# Patient Record
Sex: MALE | Race: WHITE | NOT HISPANIC OR LATINO | URBAN - METROPOLITAN AREA
[De-identification: names, ages, dates, MRNs, and addresses within clinical notes are randomized per-mention and may not be internally consistent; named-entity substitution may affect disease eponyms.]

---

## 2018-02-02 ENCOUNTER — OFFICE VISIT CONVERTED (OUTPATIENT)
Dept: ORTHOPEDIC SURGERY | Facility: CLINIC | Age: 23
End: 2018-02-02
Attending: ORTHOPAEDIC SURGERY

## 2018-02-12 ENCOUNTER — OFFICE VISIT CONVERTED (OUTPATIENT)
Dept: ORTHOPEDIC SURGERY | Facility: CLINIC | Age: 23
End: 2018-02-12
Attending: ORTHOPAEDIC SURGERY

## 2019-01-21 ENCOUNTER — HOSPITAL ENCOUNTER (OUTPATIENT)
Dept: PHYSICAL THERAPY | Facility: CLINIC | Age: 24
Discharge: HOME | End: 2019-01-21
Attending: EMERGENCY MEDICINE

## 2019-10-10 ENCOUNTER — HOSPITAL ENCOUNTER (OUTPATIENT)
Dept: OTHER | Facility: HOSPITAL | Age: 24
Discharge: HOME | End: 2019-10-10
Attending: FAMILY MEDICINE

## 2019-10-10 LAB
ALBUMIN SERPL-MCNC: 5.1 G/DL (ref 3.5–5)
ALBUMIN/GLOB SERPL: 1.5 {RATIO} (ref 1.4–2.6)
ALP SERPL-CCNC: 75 U/L (ref 53–128)
ALT SERPL-CCNC: 27 U/L (ref 10–40)
ANION GAP SERPL CALC-SCNC: 18 MMOL/L (ref 8–19)
APPEARANCE UR: CLEAR
AST SERPL-CCNC: 24 U/L (ref 15–50)
BASOPHILS # BLD AUTO: 0.05 10*3/UL (ref 0–0.2)
BASOPHILS NFR BLD AUTO: 1.1 % (ref 0–3)
BILIRUB SERPL-MCNC: 0.46 MG/DL (ref 0.2–1.3)
BILIRUB UR QL: NEGATIVE
BUN SERPL-MCNC: 11 MG/DL (ref 5–25)
BUN/CREAT SERPL: 11 {RATIO} (ref 6–20)
CALCIUM SERPL-MCNC: 10.5 MG/DL (ref 8.7–10.4)
CHLORIDE SERPL-SCNC: 100 MMOL/L (ref 99–111)
CHOLEST SERPL-MCNC: 195 MG/DL (ref 107–200)
CHOLEST/HDLC SERPL: 4 {RATIO} (ref 3–6)
COLOR UR: YELLOW
CONV ABS IMM GRAN: 0.02 10*3/UL (ref 0–0.2)
CONV CO2: 26 MMOL/L (ref 22–32)
CONV COLLECTION SOURCE (UA): NORMAL
CONV IMMATURE GRAN: 0.4 % (ref 0–1.8)
CONV TOTAL PROTEIN: 8.4 G/DL (ref 6.3–8.2)
CONV UROBILINOGEN IN URINE BY AUTOMATED TEST STRIP: 0.2 {EHRLICHU}/DL (ref 0.1–1)
CREAT UR-MCNC: 0.99 MG/DL (ref 0.7–1.2)
DEPRECATED RDW RBC AUTO: 40.4 FL (ref 35.1–43.9)
EOSINOPHIL # BLD AUTO: 0.1 10*3/UL (ref 0–0.7)
EOSINOPHIL # BLD AUTO: 2.2 % (ref 0–7)
ERYTHROCYTE [DISTWIDTH] IN BLOOD BY AUTOMATED COUNT: 13.4 % (ref 11.6–14.4)
EST. AVERAGE GLUCOSE BLD GHB EST-MCNC: 108 MG/DL
GFR SERPLBLD BASED ON 1.73 SQ M-ARVRAT: >60 ML/MIN/{1.73_M2}
GLOBULIN UR ELPH-MCNC: 3.3 G/DL (ref 2–3.5)
GLUCOSE SERPL-MCNC: 91 MG/DL (ref 70–99)
GLUCOSE UR QL: NEGATIVE MG/DL
HBA1C MFR BLD: 5.4 % (ref 3.5–5.7)
HCT VFR BLD AUTO: 47 % (ref 42–52)
HDLC SERPL-MCNC: 49 MG/DL (ref 40–60)
HGB BLD-MCNC: 14.5 G/DL (ref 14–18)
HGB UR QL STRIP: NEGATIVE
KETONES UR QL STRIP: NEGATIVE MG/DL
LDLC SERPL CALC-MCNC: 121 MG/DL (ref 70–100)
LEUKOCYTE ESTERASE UR QL STRIP: NEGATIVE
LYMPHOCYTES # BLD AUTO: 1.59 10*3/UL (ref 1–5)
LYMPHOCYTES NFR BLD AUTO: 35.1 % (ref 20–45)
MCH RBC QN AUTO: 25.6 PG (ref 27–31)
MCHC RBC AUTO-ENTMCNC: 30.9 G/DL (ref 33–37)
MCV RBC AUTO: 82.9 FL (ref 80–96)
MONOCYTES # BLD AUTO: 0.34 10*3/UL (ref 0.2–1.2)
MONOCYTES NFR BLD AUTO: 7.5 % (ref 3–10)
NEUTROPHILS # BLD AUTO: 2.43 10*3/UL (ref 2–8)
NEUTROPHILS NFR BLD AUTO: 53.7 % (ref 30–85)
NITRITE UR QL STRIP: NEGATIVE
NRBC CBCN: 0 % (ref 0–0.7)
OSMOLALITY SERPL CALC.SUM OF ELEC: 289 MOSM/KG (ref 273–304)
PH UR STRIP.AUTO: 6 [PH] (ref 5–8)
PLATELET # BLD AUTO: 255 10*3/UL (ref 130–400)
PMV BLD AUTO: 9.5 FL (ref 9.4–12.4)
POTASSIUM SERPL-SCNC: 3.9 MMOL/L (ref 3.5–5.3)
PROT UR QL: NEGATIVE MG/DL
RBC # BLD AUTO: 5.67 10*6/UL (ref 4.7–6.1)
SODIUM SERPL-SCNC: 140 MMOL/L (ref 135–147)
SP GR UR: 1.02 (ref 1–1.03)
T4 FREE SERPL-MCNC: 1.1 NG/DL (ref 0.9–1.8)
TRIGL SERPL-MCNC: 126 MG/DL (ref 40–150)
TSH SERPL-ACNC: 1.91 M[IU]/L (ref 0.27–4.2)
VLDLC SERPL-MCNC: 25 MG/DL (ref 5–37)
WBC # BLD AUTO: 4.53 10*3/UL (ref 4.8–10.8)

## 2019-10-11 LAB — HCV AB SER DONR QL: 0.1 S/CO RATIO (ref 0–0.9)

## 2019-10-13 LAB — CONV HIV COMBO AG/AB (HIV-1/O/2) WITH REFLEX: NEGATIVE

## 2019-10-14 ENCOUNTER — OFFICE VISIT CONVERTED (OUTPATIENT)
Dept: ORTHOPEDIC SURGERY | Facility: CLINIC | Age: 24
End: 2019-10-14
Attending: ORTHOPAEDIC SURGERY

## 2021-05-15 VITALS — HEART RATE: 90 BPM | OXYGEN SATURATION: 97 % | BODY MASS INDEX: 24.22 KG/M2 | HEIGHT: 71 IN | WEIGHT: 173 LBS

## 2021-05-16 VITALS — WEIGHT: 160 LBS | BODY MASS INDEX: 22.4 KG/M2 | HEIGHT: 71 IN | HEART RATE: 77 BPM | OXYGEN SATURATION: 98 %

## 2021-05-16 VITALS — BODY MASS INDEX: 22.4 KG/M2 | HEIGHT: 71 IN | OXYGEN SATURATION: 96 % | WEIGHT: 160 LBS | HEART RATE: 101 BPM

## 2024-08-06 ENCOUNTER — OFFICE VISIT (OUTPATIENT)
Dept: ORTHOPEDIC SURGERY | Facility: CLINIC | Age: 29
End: 2024-08-06
Payer: COMMERCIAL

## 2024-08-06 VITALS
DIASTOLIC BLOOD PRESSURE: 85 MMHG | SYSTOLIC BLOOD PRESSURE: 122 MMHG | WEIGHT: 188.4 LBS | HEIGHT: 71 IN | HEART RATE: 91 BPM | OXYGEN SATURATION: 93 % | BODY MASS INDEX: 26.38 KG/M2

## 2024-08-06 DIAGNOSIS — M25.561 RIGHT KNEE PAIN, UNSPECIFIED CHRONICITY: Primary | ICD-10-CM

## 2024-08-06 DIAGNOSIS — M25.562 LEFT KNEE PAIN, UNSPECIFIED CHRONICITY: ICD-10-CM

## 2024-08-06 DIAGNOSIS — M22.2X2 PATELLOFEMORAL PAIN SYNDROME OF BOTH KNEES: ICD-10-CM

## 2024-08-06 DIAGNOSIS — M22.2X1 PATELLOFEMORAL PAIN SYNDROME OF BOTH KNEES: ICD-10-CM

## 2024-08-06 PROCEDURE — 99203 OFFICE O/P NEW LOW 30 MIN: CPT | Performed by: ORTHOPAEDIC SURGERY

## 2024-08-06 NOTE — PROGRESS NOTES
"Chief Complaint  Initial Evaluation of the Right Knee and Initial Evaluation of the Left Knee     Subjective      Efra Martinez presents to [facility] for initial evaluation of bilateral knees. He has had pain in his knee for years.  He has bad genetics with knees and just wants to check his knees out.  He has pain deep in the patella. He was at active 8 games and has had increase pain in the right knee since then.  Typically his left knee is the worst.   He has had no recent injury or falls.      No Known Allergies     Social History     Socioeconomic History    Marital status: Single   Tobacco Use    Smoking status: Never    Smokeless tobacco: Never   Vaping Use    Vaping status: Every Day    Substances: Nicotine, Flavoring    Devices: Disposable, Pre-filled pod   Substance and Sexual Activity    Alcohol use: Yes    Drug use: Defer    Sexual activity: Defer        I reviewed the patient's chief complaint, history of present illness, review of systems, past medical history, surgical history, family history, social history, medications, and allergy list.     Review of Systems     Constitutional: Denies fevers, chills, weight loss  Cardiovascular: Denies chest pain, shortness of breath  Skin: Denies rashes, acute skin changes  Neurologic: Denies headache, loss of consciousness        Vital Signs:   /85   Pulse 91   Ht 180.3 cm (70.98\")   Wt 85.5 kg (188 lb 6.4 oz)   SpO2 93%   BMI 26.29 kg/m²          Physical Exam  General: Alert. No acute distress    Ortho Exam        BILATERAL KNEES Flexion 130. Extension 0. Stable to varus/valgus stress. Stable to anterior/posterior drawer. Neurovascularly intact. Negative Adam. Negative Lachman. Positive EHL, FHL, HS and TA. Sensation intact to light touch all 5 nerves of the foot. Ambulates with Non-antalgic gait. Patella is well tracking. Calf supple, non-tender. Negative tenderness to the medial joint line. Negative tenderness " to the lateral joint line. Negative Crepitus. Good strength to hamstrings, quadriceps, dorsiflexors, and plantar flexors.  Knee Extensor Mechanism intact Mild tenderness over the patella.        Procedures      Imaging Results (Most Recent)       Procedure Component Value Units Date/Time    XR Knee 3 View Right [[id number]] Resulted: 08/06/24 1522     Updated: 08/06/24 1526    XR Knee 3 View Left [[id number]] Resulted: 08/06/24 1522     Updated: 08/06/24 1526             Result Review :     X-Ray Report:  Right knee X-Ray  Indication: Evaluation of the right knee  AP/Lateral and [place] view(s)  Findings:  Mild patella tilt.  No degenerative changes of the knee.  Prior studies available for comparison: no     X-Ray Report:  Left knee X-Ray  Indication: Evaluation of the left knee  AP/Lateral and [place] view(s)  Findings: Mild patella tilt.  No degenerative changes of the knee.   Prior studies available for comparison: no             Assessment and Plan     Diagnoses and all orders for this visit:    1. Right knee pain, unspecified chronicity (Primary)  -     XR Knee 3 View Right    2. Left knee pain, unspecified chronicity  -     XR Knee 3 View Left    3. Patellofemoral pain syndrome of both knees        Discussed the treatment plan with the patient. I reviewed the X-rays that were obtained today with the patient.     Modify activities as needed.  HEP exercises.       Educated on risk of smoking/nicotine. Discussed options for smoking cessation. and Call or return if worsening symptoms.    Follow Up     PRN      Patient was given instructions and counseling regarding his condition or for health maintenance advice. Please see specific information pulled into the AVS if appropriate.     Scribed for Sudha Chavez MD by Gavi Broderick MA.  08/06/24   15:20 EDT    I have personally performed the services described in this document as scribed by the above individual and it is both accurate and complete. uSdha ROWLAND  MD Scott 08/06/24

## 2025-03-12 NOTE — PROGRESS NOTES
"[facility]  Initial Evaluation    Referring Provider:  Ksenia Bustillos, DNP, APRN  [address] [facility]  [address] [place] [id number]    Chief Complaint: \"mental health evaluations-look at PTSD\"    History of Present Illness: Efra Martinez is a guarded 29 y.o. male who presents today for initial evaluation regarding psychiatric evaluation. Efra presents unaccompanied in no acute distress and engages with me appropriately. Psychotropic regimen with which patient presents is described as nothing for psychiatric indication.     History is positive for signs/symptoms suggestive of consistent and excessive worry across several domains of life that contributes to tension and irritability throughout the day. Reports poor concentration and focus.      Patient did report a past history of seeking out parties or experiences that could potentially be harmful to himself.  He considered this as possibly being an iteration of self-harming behavior.  Patient did not elaborate further.     Efra endorses having some perceptual disturbances- specifically auditory hallucinations that sometimes occur when he is getting to fall asleep. He denies current nightmares-but has experienced them in the past.     Efra presents to the office wondering about PTSD-patient states around age 12 he watched a snuff film on the internet, and later around age [age] he watched them more often.  He endorses being bothered by the imagery-he voices experiencing nightmarish visions-from time to time.  Efra also reports witnessing his sister's displacement of her kneecap-and continues to experience disturbing images of that incident.  Efra reports that he has his knee problems and the imagery causes him distress.   Patient has history of significant trauma for which there are related intrusion symptoms related to the traumatic event (exposure to snuff films, witnessing sisters' knee injury) distressing " memories, flashbacks, nightmares, intense distress associated with triggering stimuli, marked physiological reactions to triggering stimuli), persistent avoidance of triggering stimuli, negative alterations in cognition and mood (memory lapses, negative schemas, distorted cognitions about the event, social withdrawal, feelings of detachment/estrangement, persistent anhedonia), and marked alterations in arousal and reactivity (irritability, reckless behavior, hypervigilance, exaggerated startle, sleep disturbances).     Efra endorses having experienced supernatural happenings in his youth and participating in paranormal investigations.     He endorses having trouble concentrating, trouble completing tasks, making errors in routine tasks, issues remembering obligations, trouble with organization, fidgeting, and associated irritability/anxiety with these deficits. Patient endorses having interventions in place that he has developed to mitigate those symptoms. Patient voiced that he has learned and successfully implemented compensatory coping skills that, with the Worship of layered stressors throughout adulthood.     ASRS  6/6 5/12 11/18 (to be scanned to chart)    Toward the end of the visit,  Efra endorses wanting to further discuss these issues, as well as discuss obsessional compulsive disorder.  He endorses wanting to call the office to decide if he wants to set up an additional appointment, seek another opinion.      I briefly presented information on ADHD medications-nonstimulant versus stimulant, reviewed side effects to each.  Advised patient that some patients with ADHD used applications on her phone or ADHD coaching.  At this time, patient did not show interest in pharmaceutical intervention.  Recommended that the patient consider trauma based therapy such as EMDR.  Offered to refer patient out to trauma based psychotherapy services.  Patient declined, stating that he will let me know should he desire  referral.  In regard to his diagnoses, the picture is still rather unclear-time was spent trying to reach a level of rapport.  Advised patient that diagnosis made today are not definitive-more time and evaluation is needed, as well as continuing building rapport.   I have counseled the patient with regard to diagnoses and the recommended treatment regimen as documented below. Patient acknowledges the diagnoses per my rendered interpretation. Patient demonstrates understanding of potential risks/benefits/side effects associated with this regimen and is amenable to proceed in this fashion.       Social History     Socioeconomic History    Marital status:    Tobacco Use    Smoking status: Former     Current packs/day: 0.00     Average packs/day: 0.5 packs/day for 5.3 years (2.6 ttl pk-yrs)     Types: Cigarettes     Start date: 2012     Quit date: 10/1/2017     Years since quittin.4    Smokeless tobacco: Never   Vaping Use    Vaping status: Every Day    Substances: Nicotine, Flavoring    Devices: Disposable, Pre-filled pod   Substance and Sexual Activity    Alcohol use: Yes     Alcohol/week: 2.0 standard drinks of alcohol     Types: 2 Drinks containing 0.5 oz of alcohol per week     Comment: Currently lowering alcohol consumption    Drug use: Defer    Sexual activity: Yes     Partners: Female     Birth control/protection: Natural family planning/Rhythm     Comment: , trying for kids       Tobacco use counseling/intervention: patient does not use tobacco.   Problem List:  There is no problem list on file for this patient.    Allergy:   No Known Allergies     Discontinued Medications:  There are no discontinued medications.    Current Medications:   Current Outpatient Medications   Medication Sig Dispense Refill    cholecalciferol (Vitamin D, Cholecalciferol,) 25 MCG (1000 UT) tablet Take 2 tablets by mouth Daily.      Ketoconazole 1 % shampoo       Omega-3 Fatty Acids (fish oil) 1000 MG capsule  "capsule Take  by mouth.      pyrithione zinc (HEAD AND SHOULDERS) 1 % shampoo       Salicylic Acid (Nizoral Psoriasis Shampoo/Cond) 3 % shampoo       Salicylic Acid 2 % liquid Apply  topically. Face wash       No current facility-administered medications for this visit.     Past Medical History:  Past Medical History:   Diagnosis Date    Anxiety 2005    Therapy in [id number]     Past Surgical History:  Past Surgical History:   Procedure Laterality Date    SKIN BIOPSY  2008    Non-cancerous growth behind left ear       MENTAL STATUS EXAM   General Appearance:  Cleanly groomed and dressed and well developed  Eye Contact:  Good eye contact  Attitude:  Cooperative, polite and candid  Motor Activity:  Normal gait, posture  Muscle Strength:  Normal  Speech:  Normal rate, tone, volume  Language:  Spontaneous  Mood and affect:  Normal, pleasant  Thought Process:  Logical and goal-directed  Associations/ Thought Content:  No delusions  Hallucinations:  None  Suicidal Ideations:  Not present and specific thoughts but denies intent  Homicidal Ideation:  Not present  Sensorium:  Alert  Orientation:  Person and place  Immediate Recall, Recent, and Remote Memory:  Intact  Attention Span/ Concentration:  Good  Fund of Knowledge:  Appropriate for age and educational level  Intellectual Functioning:  Average range  Insight:  Good  Judgement:  Good  Reliability:  Good  Impulse Control:  Good      Vital Signs:   /82   Pulse 84   Ht 182.9 cm (72\")   Wt 83.7 kg (184 lb 9.6 oz)   BMI 25.04 kg/m²    Lab Results:   No visits with results within 6 Month(s) from this visit.   Latest known visit with results is:   No results found for any previous visit.           Psychiatric screening is negative for pathognomonic history of: seizures, sandra, psychosis,     I have counseled the patient with regard to diagnoses and the recommended treatment regimen as documented below: I will assume prescriptive responsibility for to be " determined.     Patient acknowledges the diagnoses per my rendered interpretation. Patient demonstrates awareness/understanding of viable alternatives for treatment as well as potential risks, benefits, and side effects associated with this regimen and is amenable to proceed in this fashion.     Recommended lifestyle changes: 30 minutes of activity to increase HR 2-3 days weekly.    Psychiatric History:  Diagnoses: anxiety  Outpatient history: therapy   Inpatient history: none  Medication trials: hydroxyzine   Other treatment modalities: nothing other than talk therapy  Self harm: none  Suicide attempts: none  Abuse or neglect: none    Substance Use History:   Types/methods/frequency: *nothing current  Transtheoretical stage: not applicable     Social History:  Residence: lives house, wife, 2 cat  Vocation: on the clock, [employer] specialist    Source of income: Employed  Last grade completed: bachelors degree  Pertinent developmental history: struggles in school, focus or attentions, no diagnosis  Pertinent legal history: none  Hobbies/interests: having time to pursue interests or hobbies, spending time with others, plays in band, learning  Christian: yes  Exercise: nothing formal  Dietary habits: healthy eating   Sleep hygiene: try for 8, fluctuate 6 1/2 to 8 hours   Social habits: feels well supported  Sunlight: There are no concern for under-exposure.  Caffeine intake: 300 mg po day  Hydration habits: no pertinent issues    history: No    Social History     Socioeconomic History    Marital status:    Tobacco Use    Smoking status: Former     Current packs/day: 0.00     Average packs/day: 0.5 packs/day for 5.3 years (2.6 ttl pk-yrs)     Types: Cigarettes     Start date: 2012     Quit date: 10/1/2017     Years since quittin.4    Smokeless tobacco: Never   Vaping Use    Vaping status: Every Day    Substances: Nicotine, Flavoring    Devices: Disposable, Pre-filled pod    Substance and Sexual Activity    Alcohol use: Yes     Alcohol/week: 2.0 standard drinks of alcohol     Types: 2 Drinks containing 0.5 oz of alcohol per week     Comment: Currently lowering alcohol consumption    Drug use: Defer    Sexual activity: Yes     Partners: Female     Birth control/protection: Natural family planning/Rhythm     Comment: , trying for kids     Access to Firearms: yes    Tobacco use counseling/intervention: N/A, patient does not use tobacco    PHQ-9 Depression Screening  PHQ-9 Total Score: 8    Little interest or pleasure in doing things? Several days   Feeling down, depressed, or hopeless? Several days   PHQ-2 Total Score 2   Trouble falling or staying asleep, or sleeping too much? Not at all   Feeling tired or having little energy? Over half   Poor appetite or overeating? Not at all   Feeling bad about yourself - or that you are a failure or have let yourself or your family down? Several days   Trouble concentrating on things, such as reading the newspaper or watching television? Over half   Moving or speaking so slowly that other people could have noticed? Or the opposite - being so fidgety or restless that you have been moving around a lot more than usual? Several days   Thoughts that you would be better off dead, or of hurting yourself in some way? Not at all   PHQ-9 Total Score 8   If you checked off any problems, how difficult have these problems made it for you to do your work, take care of things at home, or get along with other people? Very difficult       AC-7  Feeling nervous, anxious or on edge: More than half the days  Not being able to stop or control worrying: More than half the days  Worrying too much about different things: More than half the days  Trouble Relaxing: Several days  Being so restless that it is hard to sit still: Several days  Feeling afraid as if something awful might happen: Several days  Becoming easily annoyed or irritable: Several days  AC 7 Total  Score: 10  If you checked any problems, how difficult have these problems made it for you to do your work, take care of things at home, or get along with other people: Somewhat difficult    Problem List:  There is no problem list on file for this patient.    Allergy:   No Known Allergies     Discontinued Medications:  There are no discontinued medications.    Current Medications:   Current Outpatient Medications   Medication Sig Dispense Refill    cholecalciferol (Vitamin D, Cholecalciferol,) 25 MCG (1000 UT) tablet Take 2 tablets by mouth Daily.      Ketoconazole 1 % shampoo       Omega-3 Fatty Acids (fish oil) 1000 MG capsule capsule Take  by mouth.      pyrithione zinc (HEAD AND SHOULDERS) 1 % shampoo       Salicylic Acid (Nizoral Psoriasis Shampoo/Cond) 3 % shampoo       Salicylic Acid 2 % liquid Apply  topically. Face wash       No current facility-administered medications for this visit.     Past Medical History:  Past Medical History:   Diagnosis Date    Anxiety 2005    Therapy in [id number]     Past Surgical History:  Past Surgical History:   Procedure Laterality Date    SKIN BIOPSY  2008    Non-cancerous growth behind left ear     Family History:   Family History   Problem Relation Age of Onset    No Known Problems Mother     No Known Problems Father     ADD / ADHD Sister     Anxiety disorder Sister     Depression Sister     OCD Sister     No Known Problems Sister     No Known Problems Brother     No Known Problems Maternal Aunt     No Known Problems Paternal Aunt     No Known Problems Maternal Uncle     No Known Problems Paternal Uncle     No Known Problems Maternal Grandfather     No Known Problems Maternal Grandmother     No Known Problems Paternal Grandfather     No Known Problems Paternal Grandmother     No Known Problems Cousin     No Known Problems Other     Alcohol abuse Neg Hx     Bipolar disorder Neg Hx     Dementia Neg Hx     Drug abuse Neg Hx     Paranoid behavior Neg Hx     Schizophrenia Neg Hx   "   Seizures Neg Hx     Self-Injurious Behavior  Neg Hx     Suicide Attempts Neg Hx          Vital Signs:   /82   Pulse 84   Ht 182.9 cm (72\")   Wt 83.7 kg (184 lb 9.6 oz)   BMI 25.04 kg/m²    Lab Results:   No visits with results within 6 Month(s) from this visit.   Latest known visit with results is:   No results found for any previous visit.       ASSESSMENT AND PLAN:    ICD-10-CM ICD-9-CM   1. AC (generalized anxiety disorder)  F41.1 300.02   2. Adult ADHD  F90.9 314.01       Efra who presents today for initial evaluation regarding psychiatric consultation/evalauation. We have discussed the history and interpreted diagnoses as above as well as the treatment plan below, including potential R/B/SE of the recommended regimen of which the patient demonstrates understanding. Patient is agreeable to call 911 or go to the nearest ER should he become concerned for his own safety and/or the safety of those around him. There are not overt indices of acute sandra/psychosis on evaluation today.     Medication regimen: nothing at this time; patient is advised not to misuse prescribed medications or to use any exogenous substances that aren't disclosed to this provider as they may interact with the regimen to his detriment.   Risk Assessment: protracted risk is moderate, imminent risk is moderate..  Risk factors include:  anxiety disorder, mood disorder, and recent/ongoing psychosocial stressors. Protective factors include: no known family history of suicidality, intact reality testing, no substance use disorder, no present SI, no stated history of suicide attempts or self-harm, patient's exhibited future-orientation, strong social support, and patient's cooperation with care. Do note that this is subject to change with the Confucianism of new stressors, treatment non-adherence, use of substances, and/or new medical ails.  Monitoring: reviewed labs/imaging as populated above,  PHQ-9 today is PHQ-9 Total Score: 8 /27, " AC-7 today is 10/21.  Therapy: declined, deferred  Follow-up: as desired by patient  Communications: N/A    TREATMENT PLAN/GOALS: challenge patterns of living conducive to symptom burden, implement recommended regimen as above with augmentative, intermittent supportive psychotherapy to reduce symptom burden. Patient acknowledged and verbally consented to begin treatment as above. The importance of adherence to the recommended treatment and interval follow-up appointments was emphasized today. Patient was today advised to limit daily caffeine intake, hydrate appropriately, eat healthy and nutritious foods, engage sleep hygiene measures, engage appropriate exposure to sunlight, engage with hobbies in balance with life necessities, and exercise appropriate to their capacity to do so.     Billing: I have seen the patient today and considered his psychiatric complaints, rendered a diagnosis, and discussed treatment with the patient as above with which he consents.    Parts of this note are electronic transcriptions/translations of spoken language to printed text using the Dragon Dictation system.    Electronically signed by KAYLEN Knox, 03/12/25,

## 2025-03-13 ENCOUNTER — OFFICE VISIT (OUTPATIENT)
Dept: PSYCHIATRY | Facility: CLINIC | Age: 30
End: 2025-03-13
Payer: COMMERCIAL

## 2025-03-13 VITALS
SYSTOLIC BLOOD PRESSURE: 131 MMHG | WEIGHT: 184.6 LBS | HEART RATE: 84 BPM | DIASTOLIC BLOOD PRESSURE: 82 MMHG | HEIGHT: 72 IN | BODY MASS INDEX: 25 KG/M2

## 2025-03-13 DIAGNOSIS — F90.9 ADULT ADHD: ICD-10-CM

## 2025-03-13 DIAGNOSIS — F41.1 GAD (GENERALIZED ANXIETY DISORDER): Primary | ICD-10-CM

## 2025-03-13 DIAGNOSIS — F43.10 POST TRAUMATIC STRESS DISORDER (PTSD): ICD-10-CM

## 2025-03-13 RX ORDER — CHOLECALCIFEROL (VITAMIN D3) 25 MCG
2000 TABLET ORAL DAILY
COMMUNITY

## 2025-03-13 RX ORDER — SALICYLIC ACID 3 %
SHAMPOO TOPICAL
COMMUNITY
Start: 2025-01-01

## 2025-03-13 RX ORDER — CHLORAL HYDRATE 500 MG
CAPSULE ORAL
COMMUNITY

## 2025-03-20 NOTE — PROGRESS NOTES
"[facility]  Follow-up Visit  Services today rendered via telehealth ([facility] [employer]) for which the patient provided informed consent. Patient is aware he can refuse to be seen remotely at any time. Patient was located in a secure, remote environment (his home) as was the provider (in-office). I confirmed the patient's identity prior to evaluation and reiterated my credentials; there were no technical issues during the session today.   Chief Complaint:  \"mental health evaluations-look at PTSD\"     History of Present Illness: Efra Martinez is a guarded 29 y.o. male who presents today for initial evaluation regarding psychiatric evaluation. Efra presents unaccompanied in no acute distress and engages with me appropriately. Psychotropic regimen with which patient presents is described as nothing for psychiatric indication.      History is positive for signs/symptoms suggestive of consistent and excessive worry across several domains of life that contributes to tension and irritability throughout the day. Reports poor concentration and focus.       Patient did report a past history of seeking out parties or experiences that could potentially be harmful to himself.  He considered this as possibly being an iteration of self-harming behavior.  Patient did not elaborate further.      Efra endorses having some perceptual disturbances- specifically auditory hallucinations that sometimes occur when he is getting to fall asleep. He denies current nightmares-but has experienced them in the past.      Efra presents to the office wondering about PTSD-patient states around age 12 he watched a snuff film on the internet, and later around age 17-18 he watched them more often.  He endorses being bothered by the imagery-he voices experiencing nightmarish visions-from time to time.  Efra also reports witnessing his sister's displacement of her kneecap-and continues to experience disturbing images " of that incident.  Efra reports that he has his knee problems and the imagery causes him distress.   Patient has history of significant trauma for which there are related intrusion symptoms related to the traumatic event (exposure to snuff films, witnessing sisters' knee injury) distressing memories, flashbacks, nightmares, intense distress associated with triggering stimuli, marked physiological reactions to triggering stimuli), persistent avoidance of triggering stimuli, negative alterations in cognition and mood (memory lapses, negative schemas, distorted cognitions about the event, social withdrawal, feelings of detachment/estrangement, persistent anhedonia), and marked alterations in arousal and reactivity (irritability, reckless behavior, hypervigilance, exaggerated startle, sleep disturbances).      Efra endorses having experienced supernatural happenings in his youth and participating in paranormal investigations.      He endorses having trouble concentrating, trouble completing tasks, making errors in routine tasks, issues remembering obligations, trouble with organization, fidgeting, and associated irritability/anxiety with these deficits. Patient endorses having interventions in place that he has developed to mitigate those symptoms. Patient voiced that he has learned and successfully implemented compensatory coping skills that, with the Confucianist of layered stressors throughout adulthood.      ASRS  6/6 5/12 11/18 (to be scanned to chart)     Toward the end of the visit,  Efra endorses wanting to further discuss these issues, as well as discuss obsessional compulsive disorder.  He endorses wanting to call the office to decide if he wants to set up an additional appointment, seek another opinion.       I briefly presented information on ADHD medications-nonstimulant versus stimulant, reviewed side effects to each.  Advised patient that some patients with ADHD used applications on her phone or ADHD  "coaching.  At this time, patient did not show interest in pharmaceutical intervention.  Recommended that the patient consider trauma based therapy such as EMDR.  Offered to refer patient out to trauma based psychotherapy services.  Patient declined, stating that he will let me know should he desire referral.  In regard to his diagnoses, the picture is still rather unclear-time was spent trying to reach a level of rapport.  Advised patient that diagnosis made today are not definitive-more time and evaluation is needed, as well as continuing building rapport.   I have counseled the patient with regard to diagnoses and the recommended treatment regimen as documented below. Patient acknowledges the diagnoses per my rendered interpretation. Patient demonstrates understanding of potential risks/benefits/side effects associated with this regimen and is amenable to proceed in this fashion.           Interval History Efra is a guarded  29 y.o. male who presents today for follow-up. Efra presents unaccompanied in no acute distress and engages with me appropriately.   Current treatment regimen includes:   - nothing  Side-effects per given history: none.    Today the patient feels \"stressed\" he says work has been stressful. He voices that he has been under some stress in her marriage, and he reports that he feels like he has to walk around on eggshells. He has always had nightmares. He reports that he has repetitive dreams-being chased in the past. He reports that his wife is reports restless breathing hard.   Thought process and content are devoid of overt aberration suggestive of acute sandra/psychosis. The patient denies SI/HI/AVH. There are changes on exam today compared to most recent evaluation.    - sleep: no concerns  - appetite: moderately controlled  Patient I did discuss treatment options.  Efra is interested in psychotherapy.  At this time I advised him that I believe he would be better served by seeing a trauma " based psychotherapist. Advised patient that my current role is mainly medication management, and that insurance only allows a certain amount of time for these appointments. Patient verbalized understanding. Invited patient to reach back out should he want to discuss medication therapy or if he needs referrals.  Patient verbalizes understanding.   I have counseled the patient with regard to diagnoses and the recommended treatment regimen as documented below. Patient acknowledges the diagnoses per my rendered interpretation. Patient demonstrates understanding of potential risks/benefits/side effects associated with this regimen and is amenable to proceed in this fashion.       Social History     Socioeconomic History    Marital status:    Tobacco Use    Smoking status: Former     Current packs/day: 0.00     Average packs/day: 0.5 packs/day for 5.3 years (2.6 ttl pk-yrs)     Types: Cigarettes     Start date: 2012     Quit date: 10/1/2017     Years since quittin.4    Smokeless tobacco: Never   Vaping Use    Vaping status: Every Day    Substances: Nicotine, Flavoring    Devices: Disposable, Pre-filled pod   Substance and Sexual Activity    Alcohol use: Yes     Alcohol/week: 2.0 standard drinks of alcohol     Types: 2 Drinks containing 0.5 oz of alcohol per week     Comment: Currently lowering alcohol consumption    Drug use: Defer    Sexual activity: Yes     Partners: Female     Birth control/protection: Natural family planning/Rhythm     Comment: , trying for kids       Tobacco use counseling/intervention: patient does not use tobacco.   Problem List:  There is no problem list on file for this patient.    Allergy:   No Known Allergies     Discontinued Medications:  There are no discontinued medications.    Current Medications:   Current Outpatient Medications   Medication Sig Dispense Refill    cholecalciferol (Vitamin D, Cholecalciferol,) 25 MCG (1000 UT) tablet Take 2 tablets by mouth Daily.       Ketoconazole 1 % shampoo       Omega-3 Fatty Acids (fish oil) 1000 MG capsule capsule Take  by mouth.      pyrithione zinc (HEAD AND SHOULDERS) 1 % shampoo       Salicylic Acid (Nizoral Psoriasis Shampoo/Cond) 3 % shampoo       Salicylic Acid 2 % liquid Apply  topically. Face wash       No current facility-administered medications for this visit.     Past Medical History:  Past Medical History:   Diagnosis Date    Anxiety 2005    Therapy in [id number]     Past Surgical History:  Past Surgical History:   Procedure Laterality Date    SKIN BIOPSY  2008    Non-cancerous growth behind left ear       MENTAL STATUS EXAM   General Appearance:  Cleanly groomed and dressed and well developed  Eye Contact:  Good eye contact  Attitude:  Cooperative, polite and candid  Motor Activity:  Normal gait, posture  Muscle Strength:  Normal  Speech:  Normal rate, tone, volume  Language:  Spontaneous  Mood and affect:  Normal, pleasant  Thought Process:  Logical and goal-directed  Associations/ Thought Content:  No delusions  Hallucinations:  None  Suicidal Ideations:  Not present  Homicidal Ideation:  Not present  Sensorium:  Alert and clear  Orientation:  Person  Immediate Recall, Recent, and Remote Memory:  Deficit noted  Attention Span/ Concentration:  Good  Fund of Knowledge:  Appropriate for age and educational level  Intellectual Functioning:  Average range  Insight:  Good  Judgement:  Good  Reliability:  Good  Impulse Control:  Good      Vital Signs:   There were no vitals taken for this visit.   Lab Results:   No visits with results within 6 Month(s) from this visit.   Latest known visit with results is:   No results found for any previous visit.       ASSESSMENT AND PLAN:    ICD-10-CM ICD-9-CM   1. AC (generalized anxiety disorder)  F41.1 300.02   2. Adult ADHD  F90.9 314.01   3. Post traumatic stress disorder (PTSD)  F43.10 309.81       Efra is a 29 y.o. male who presents today for follow-up regarding medication management. We  have discussed the interval history and the treatment plan below, including potential R/B/SE of the recommended regimen of which the patient demonstrates understanding. Patient is agreeable to call 911 or go to the nearest ER should he become concerned for his own safety and/or the safety of those around him. There are are no overt indices of acute sandra/psychosis on exam today. BELA reviewed and is as expected.    Medication regimen: nothing at this time; patient is advised not to misuse prescribed medications or to use them with any exogenous substances that aren't disclosed to this provider as they may interact with the regimen to the patient's detriment.   Risk Assessment: protracted risk is moderate, imminent risk is moderate. Do note that this is subject to change with the Holiness of new stressors, treatment non-adherence, use of substances, and/or new medical ails.   Monitoring: reviewed labs/imaging as populated above; ordered  Therapy: refer to [facility]   Follow-up: as needed  Communications: N/A    TREATMENT PLAN/GOALS: challenge patterns of living conducive to symptom burden, implement recommended regimen as above with augmentative, intermittent supportive psychotherapy to reduce symptom burden. Patient acknowledged and verbally consented to continue treatment. The importance of adherence to the recommended treatment and interval follow-up appointments was again emphasized today: patient has good treatment adherence per given history. Patient was today reminded to limit daily caffeine intake, hydrate appropriately, eat healthy and nutritious foods, engage sleep hygiene measures, engage appropriate exposure to sunlight, engage with hobbies in balance with life necessities, and exercise appropriate to their capacity to do so.       Parts of this note are electronic transcriptions/translations of spoken language to printed text using the Dragon Dictation system.    Electronically signed by Jo BASS  KAYLEN Cuello, 03/20/25

## 2025-03-21 ENCOUNTER — TELEMEDICINE (OUTPATIENT)
Dept: PSYCHIATRY | Facility: CLINIC | Age: 30
End: 2025-03-21
Payer: COMMERCIAL

## 2025-03-21 DIAGNOSIS — F41.1 GAD (GENERALIZED ANXIETY DISORDER): Primary | ICD-10-CM

## 2025-03-21 DIAGNOSIS — F90.9 ADULT ADHD: ICD-10-CM

## 2025-03-21 DIAGNOSIS — F43.10 POST TRAUMATIC STRESS DISORDER (PTSD): ICD-10-CM
